# Patient Record
Sex: FEMALE | Race: WHITE
[De-identification: names, ages, dates, MRNs, and addresses within clinical notes are randomized per-mention and may not be internally consistent; named-entity substitution may affect disease eponyms.]

---

## 2018-06-15 ENCOUNTER — HOSPITAL ENCOUNTER (EMERGENCY)
Dept: HOSPITAL 63 - ER | Age: 38
Discharge: HOME | End: 2018-06-15
Payer: OTHER GOVERNMENT

## 2018-06-15 VITALS — HEIGHT: 66 IN | BODY MASS INDEX: 19.61 KG/M2 | WEIGHT: 122 LBS

## 2018-06-15 VITALS — DIASTOLIC BLOOD PRESSURE: 76 MMHG | SYSTOLIC BLOOD PRESSURE: 122 MMHG

## 2018-06-15 DIAGNOSIS — H60.93: ICD-10-CM

## 2018-06-15 DIAGNOSIS — H60.333: Primary | ICD-10-CM

## 2018-06-15 PROCEDURE — 99283 EMERGENCY DEPT VISIT LOW MDM: CPT

## 2018-06-15 NOTE — ED.ADGEN
Adult General


Chief Complaint


Chief Complaint


".. I ve started swimming again.. and I think I ve gotten swimmer ear.. more 

here on the Rt...."





HPI


HPI





Patient is a 37 year old female  who presents with above hx and complaints of 

irritation both ears with more discomfort on right. Patient has sensation there 

is water still ear. Patient has tried use linear no effective results. 

Bilateral ears canals are somewhat injected. There is wax in both ears. There 

is obvious on fluid behind right TM. Patient normally healthy. No recent 

travel. No specific ill contacts. Patient normally follows at [facility].





Review of Systems


Review of Systems





Constitutional: Denies fever or chills []


Eyes: Denies change in visual acuity, redness, or eye pain []


HENT: Denies nasal congestion or sore throat []complaints of bilateral ear 

discomfort-more on right


Respiratory: Denies cough or shortness of breath []


Cardiovascular: No additional information not addressed in HPI []


GI: Denies abdominal pain, nausea, vomiting, bloody stools or diarrhea []


: Denies dysuria or hematuria []


Musculoskeletal: Denies back pain or joint pain []


Integument: Denies rash or skin lesions []


Neurologic: Denies headache, focal weakness or sensory changes []


Endocrine: Denies polyuria or polydipsia []





All other systems were reviewed and found to be within normal limits, except as 

documented in this note.





Family History


Family History


Noncontributory





Current Medications


Current Medications





Current Medications








 Medications


  (Trade)  Dose


 Ordered  Sig/Elier  Start Time


 Stop Time Status Last Admin


Dose Admin


 


 Hydrocodone


 Bitartrate/


 Ibuprofen


  (Vicoprofen


 7.5-200)  1 tab  1X  ONCE  6/15/18 18:45


 6/15/18 18:46 DC 6/15/18 18:51


1 TAB


 


 Neomycin/


 Polymyxin/


 Hydrocortisone


  (Cortisporin


 Otic)  1 drop  1X  ONCE  6/15/18 18:45


 6/15/18 18:46 DC 6/15/18 18:51


1 DROP











Allergies


Allergies





Allergies








Coded Allergies Type Severity Reaction Last Updated Verified


 


  No Known Drug Allergies    6/15/18 No











Physical Exam


Physical Exam





Constitutional: Well developed, well nourished, moderately acute distress, non-

toxic appearance. []


HENT: Normocephalic, atraumatic, bilateral external ears and mouth are injected

, bilateral wax impactions, some fluid behind right TM, oropharynx moist, no 

oral exudates, nose normal. []


Eyes: PERRLA, EOMI, conjunctiva normal, no discharge. [] 


Neck: Normal range of motion, no tenderness, supple, no stridor. [] 


Cardiovascular:Heart rate regular rhythm, no murmur []


Lungs & Thorax:  Bilateral breath sounds clear to auscultation []


Abdomen: Bowel sounds normal, soft, no tenderness, no masses, no pulsatile 

masses. [] 


Skin: Warm, dry, no erythema, no rash. [] 


Back: No tenderness, no CVA tenderness. [] 


Extremities: No tenderness, no cyanosis, no clubbing, ROM intact, no edema. [] 


Neurologic: Alert and oriented X 3, normal motor function, normal sensory 

function, no focal deficits noted. []


Psychologic: Affect normal, judgement normal, mood normal. []





Current Patient Data


Vital Signs





 Vital Signs








  Date Time  Temp Pulse Resp B/P (MAP) Pulse Ox O2 Delivery O2 Flow Rate FiO2


 


6/15/18 18:27 98.1 54 16  96 Room Air  











EKG


EKG


[]





Radiology/Procedures


Radiology/Procedures


[]





Course & Med Decision Making


Course & Med Decision Making


Pertinent Labs and Imaging studies reviewed. (See chart for details).





Stop swimming for next 7 days. Keep water of the ears. Use the proximal once a 

day at night. Use Cortisporin ear drops 1 drop each ear 4 times a day. Benadryl 

and Sudafed may be helpful to dry up fluid.  Tylenol and ibuprofen for pain. 

Marked discomfort may take Vicoprofen up 4 times a day. Follow-up primary care. 

Return if any concerns. We continue use  swim ear before and after swimming, 

when she resumes swimming. 





[]





Final Impression


Final Impression


1. Otitis[]-bilateral-primarily external (swimmer's ear)





Dragon Disclaimer


Dragon Disclaimer


This electronic medical record was generated, in whole or in part, using a 

voice recognition dictation system.











CECILIA WESTON MD Nestor 15, 2018 18:20

## 2018-06-17 ENCOUNTER — HOSPITAL ENCOUNTER (EMERGENCY)
Dept: HOSPITAL 63 - ER | Age: 38
Discharge: HOME | End: 2018-06-17
Payer: OTHER GOVERNMENT

## 2018-06-17 VITALS — HEIGHT: 62 IN | BODY MASS INDEX: 20.61 KG/M2 | WEIGHT: 112 LBS

## 2018-06-17 VITALS — SYSTOLIC BLOOD PRESSURE: 12 MMHG | DIASTOLIC BLOOD PRESSURE: 87 MMHG

## 2018-06-17 DIAGNOSIS — H66.93: Primary | ICD-10-CM

## 2018-06-17 PROCEDURE — 99283 EMERGENCY DEPT VISIT LOW MDM: CPT

## 2018-06-17 NOTE — ED.ADGEN
Past History


Past Medical History:  No Pertinent History


Past Surgical History:  No Surgical History


Alcohol Use:  Occasionally


Drug Use:  None





Adult General


HPI


HPI





Patient is a 37 year old female who presents with ear pain.  Patient states she 

"got water in her ear" last week. She was evaluated at this emergency room 2 

days earlier when she was diagnosed with otitis externa. She was given 

Vicoprofen for pain as well as Sudafed. She was treated with Cortisporin. Her 

treatment was adequate but the patient's symptoms have not improved. She has 

pain and swelling and decreased hearing in the bilateral ears. She has no 

fever. She has no mastoid tenderness.





Review of Systems


Review of Systems





Constitutional: Denies fever or chills 


Eyes: Denies change in visual acuity, redness, or eye pain 


HENT: Denies sore throat


Cardiovascular: No additional information


Integument: Denies rash or skin lesions 


Neurologic: Denies headache








All other systems were reviewed and found to be within normal limits, except as 

documented in this note.





Current Medications


Current Medications





Current Medications








 Medications


  (Trade)  Dose


 Ordered  Sig/Elier  Start Time


 Stop Time Status Last Admin


Dose Admin


 


 Ciprofloxacin


  (Cipro)  500 mg  1X  ONCE  6/17/18 21:15


 6/17/18 21:16   














Allergies


Allergies





Allergies








Coded Allergies Type Severity Reaction Last Updated Verified


 


  No Known Drug Allergies    6/15/18 No











Physical Exam


Physical Exam





Constitutional: Well developed, well nourished, no acute distress, non-toxic 

appearance. 


HENT: Normocephalic, atraumatic, bilateral external ears normal, external 

canals are both erythematous and edematous with some debris present. Bilaterally

, they do obscure the TMs. There is no mastoid erythema or tenderness. No TMJ 

pain. Oropharynx moist, no oral exudates, nose normal. 


Eyes: PERRLA, EOMI, conjunctiva normal 


Neck: Normal range of motion, no tenderness 


Cardiovascular:Heart rate regular rhythm


Lungs & Thorax:  Bilateral breath sounds 


Skin: Warm, dry, no erythema, no rash.    


Neurologic: Alert and oriented X 3





EKG


EKG


[]





Radiology/Procedures


Radiology/Procedures


[]





Course & Med Decision Making


Course & Med Decision Making


Pertinent Labs and Imaging studies reviewed. (See chart for details)





Patient is seen again in the ER for otitis media. Her symptoms did not improve 

after being treated with Cortisporin.  Her physical exam is consistent with 

this diagnosis.





Lines, patient will now be treated with oral antibiotics. She is prescribed 

ciprofloxacin 500 twice a day over the next 10 days. She is encouraged to use 

Sudafed over-the-counter and guaifenesin. She is given a prescription for nasal 

steroid inhaler as well. She is scheduled to get on an airplane in 48 hours. I 

did warn her of the risk of rupture of TM with pressure changes. She is 

encouraged to follow-up with her PCP.





Final Impression


Final Impression


Otitis media





Dragon Disclaimer


Dragon Disclaimer


This electronic medical record was generated, in whole or in part, using a 

voice recognition dictation system.











CHANTELL GAUTAM DO Jun 17, 2018 20:56